# Patient Record
Sex: MALE | URBAN - METROPOLITAN AREA
[De-identification: names, ages, dates, MRNs, and addresses within clinical notes are randomized per-mention and may not be internally consistent; named-entity substitution may affect disease eponyms.]

---

## 2023-05-03 ENCOUNTER — TELEPHONE ENCOUNTER (OUTPATIENT)
Dept: URBAN - METROPOLITAN AREA CLINIC 35 | Facility: CLINIC | Age: 28
End: 2023-05-03

## 2023-05-03 ENCOUNTER — OFFICE VISIT (OUTPATIENT)
Dept: URBAN - METROPOLITAN AREA TELEHEALTH 2 | Facility: TELEHEALTH | Age: 28
End: 2023-05-03
Payer: COMMERCIAL

## 2023-05-03 DIAGNOSIS — R68.81 EARLY SATIETY: ICD-10-CM

## 2023-05-03 PROCEDURE — 99443 PHONE E/M BY PHYS 21-30 MIN: CPT | Performed by: INTERNAL MEDICINE

## 2023-05-03 RX ORDER — BUSPIRONE HYDROCHLORIDE 10 MG/1
1 TABLET TABLET ORAL TWICE A DAY
Qty: 60 TABLET | Refills: 11 | OUTPATIENT
Start: 2023-05-03

## 2023-05-03 NOTE — HPI-TODAY'S VISIT:
has lost his appetite for two weeks  no abd pain no gerd  bowels nl  sleeps ok  has anxiety disorder and panic attacks  buspar has helped in past   is stressed    told to exercise more  does go to gym and works in [employer] business  wt stable 200

## 2024-05-06 ENCOUNTER — TELEPHONE ENCOUNTER (OUTPATIENT)
Dept: URBAN - METROPOLITAN AREA CLINIC 19 | Facility: CLINIC | Age: 29
End: 2024-05-06

## 2024-05-06 RX ORDER — BUSPIRONE HYDROCHLORIDE 10 MG/1
1 TABLET TABLET ORAL TWICE A DAY
Qty: 60 | Refills: 0
Start: 2023-05-03

## 2024-06-14 ENCOUNTER — ERX REFILL RESPONSE (OUTPATIENT)
Dept: URBAN - METROPOLITAN AREA CLINIC 126 | Facility: CLINIC | Age: 29
End: 2024-06-14

## 2024-06-14 RX ORDER — BUSPIRONE HYDROCHLORIDE 10 MG/1
TAKE 1 TABLET BY MOUTH 2 TIMES A DAY TABLET ORAL
Qty: 60 TABLET | Refills: 0 | OUTPATIENT

## 2024-06-14 RX ORDER — BUSPIRONE HYDROCHLORIDE 10 MG/1
1 TABLET TABLET ORAL TWICE A DAY
Qty: 60 | Refills: 0 | OUTPATIENT